# Patient Record
Sex: FEMALE | Employment: STUDENT | URBAN - METROPOLITAN AREA
[De-identification: names, ages, dates, MRNs, and addresses within clinical notes are randomized per-mention and may not be internally consistent; named-entity substitution may affect disease eponyms.]

---
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
## 2025-01-07 DIAGNOSIS — R56.9 SEIZURE (HCC): ICD-10-CM

## 2025-01-08 RX ORDER — LEVETIRACETAM 500 MG/1
500 TABLET ORAL EVERY 12 HOURS
Qty: 60 TABLET | Refills: 0 | Status: SHIPPED | OUTPATIENT
Start: 2025-01-08

## 2025-01-15 ENCOUNTER — TELEPHONE (OUTPATIENT)
Dept: INTERNAL MEDICINE CLINIC | Facility: CLINIC | Age: 20
End: 2025-01-15

## 2025-08-07 ENCOUNTER — APPOINTMENT (OUTPATIENT)
Dept: LAB | Facility: CLINIC | Age: 20
End: 2025-08-07

## 2025-08-07 ENCOUNTER — APPOINTMENT (OUTPATIENT)
Dept: URGENT CARE | Facility: CLINIC | Age: 20
End: 2025-08-07

## 2025-08-07 DIAGNOSIS — Z02.1 PRE-EMPLOYMENT EXAMINATION: Primary | ICD-10-CM

## 2025-08-07 DIAGNOSIS — Z02.1 PRE-EMPLOYMENT EXAMINATION: ICD-10-CM

## 2025-08-07 LAB — RUBV IGG SERPL IA-ACNC: 436.9 IU/ML

## 2025-08-07 PROCEDURE — 86480 TB TEST CELL IMMUN MEASURE: CPT

## 2025-08-07 PROCEDURE — 36415 COLL VENOUS BLD VENIPUNCTURE: CPT

## 2025-08-07 PROCEDURE — 86787 VARICELLA-ZOSTER ANTIBODY: CPT

## 2025-08-07 PROCEDURE — 86765 RUBEOLA ANTIBODY: CPT

## 2025-08-07 PROCEDURE — 86762 RUBELLA ANTIBODY: CPT

## 2025-08-07 PROCEDURE — 86735 MUMPS ANTIBODY: CPT

## 2025-08-08 LAB
GAMMA INTERFERON BACKGROUND BLD IA-ACNC: 0.07 IU/ML
M TB IFN-G BLD-IMP: NEGATIVE
M TB IFN-G CD4+ BCKGRND COR BLD-ACNC: 0.04 IU/ML
M TB IFN-G CD4+ BCKGRND COR BLD-ACNC: 0.04 IU/ML
MEV IGG SER QL IA: >300 AU/ML
MEV IGG SER QL IA: POSITIVE
MITOGEN IGNF BCKGRD COR BLD-ACNC: 9.93 IU/ML
MUV IGG SER QL IA: 107 AU/ML
MUV IGG SER QL IA: POSITIVE
VZV IGG SER QL IA: 12.3 S/CO
VZV IGG SER QL IA: POSITIVE